# Patient Record
Sex: MALE | Race: WHITE | NOT HISPANIC OR LATINO | Employment: UNEMPLOYED | URBAN - NONMETROPOLITAN AREA
[De-identification: names, ages, dates, MRNs, and addresses within clinical notes are randomized per-mention and may not be internally consistent; named-entity substitution may affect disease eponyms.]

---

## 2018-12-01 ENCOUNTER — HOSPITAL ENCOUNTER (EMERGENCY)
Facility: HOSPITAL | Age: 33
End: 2018-12-01

## 2021-08-26 PROCEDURE — 87635 SARS-COV-2 COVID-19 AMP PRB: CPT | Performed by: NURSE PRACTITIONER

## 2021-09-04 ENCOUNTER — HOSPITAL ENCOUNTER (EMERGENCY)
Facility: HOSPITAL | Age: 36
Discharge: HOME | End: 2021-09-04
Attending: FAMILY MEDICINE | Admitting: FAMILY MEDICINE

## 2021-09-04 ENCOUNTER — APPOINTMENT (OUTPATIENT)
Dept: GENERAL RADIOLOGY | Facility: HOSPITAL | Age: 36
End: 2021-09-04

## 2021-09-04 VITALS
WEIGHT: 181 LBS | TEMPERATURE: 97.8 F | SYSTOLIC BLOOD PRESSURE: 145 MMHG | OXYGEN SATURATION: 98 % | BODY MASS INDEX: 25.91 KG/M2 | DIASTOLIC BLOOD PRESSURE: 81 MMHG | HEART RATE: 96 BPM | HEIGHT: 70 IN | RESPIRATION RATE: 20 BRPM

## 2021-09-04 DIAGNOSIS — S68.119A AMPUTATION OF TIP OF FINGER, INITIAL ENCOUNTER: Primary | ICD-10-CM

## 2021-09-04 PROBLEM — S61.309A TRAUMATIC AVULSION OF NAIL PLATE OF FINGER: Status: ACTIVE | Noted: 2021-09-04

## 2021-09-04 PROBLEM — S68.623A: Status: ACTIVE | Noted: 2021-09-04

## 2021-09-04 PROCEDURE — 96375 TX/PRO/DX INJ NEW DRUG ADDON: CPT

## 2021-09-04 PROCEDURE — 25010000002 HYDROMORPHONE 1 MG/ML SOLUTION: Performed by: FAMILY MEDICINE

## 2021-09-04 PROCEDURE — 99203 OFFICE O/P NEW LOW 30 MIN: CPT | Performed by: ORTHOPAEDIC SURGERY

## 2021-09-04 PROCEDURE — 73140 X-RAY EXAM OF FINGER(S): CPT

## 2021-09-04 PROCEDURE — 99282 EMERGENCY DEPT VISIT SF MDM: CPT

## 2021-09-04 PROCEDURE — 26951 AMPUTATION OF FINGER/THUMB: CPT | Performed by: ORTHOPAEDIC SURGERY

## 2021-09-04 PROCEDURE — 96365 THER/PROPH/DIAG IV INF INIT: CPT

## 2021-09-04 RX ORDER — CEPHALEXIN 500 MG/1
500 CAPSULE ORAL 3 TIMES DAILY
Qty: 21 CAPSULE | Refills: 0 | Status: SHIPPED | OUTPATIENT
Start: 2021-09-04 | End: 2021-10-11

## 2021-09-04 RX ORDER — MELOXICAM 15 MG/1
15 TABLET ORAL DAILY
Qty: 30 TABLET | Refills: 0 | Status: SHIPPED | OUTPATIENT
Start: 2021-09-04 | End: 2021-10-11

## 2021-09-04 RX ORDER — LIDOCAINE HYDROCHLORIDE 20 MG/ML
10 INJECTION, SOLUTION INFILTRATION; PERINEURAL ONCE
Status: DISCONTINUED | OUTPATIENT
Start: 2021-09-04 | End: 2021-09-04

## 2021-09-04 RX ORDER — CEPHALEXIN 500 MG/1
500 CAPSULE ORAL 3 TIMES DAILY
Qty: 21 CAPSULE | Refills: 0 | Status: SHIPPED | OUTPATIENT
Start: 2021-09-04 | End: 2021-09-04 | Stop reason: SDUPTHER

## 2021-09-04 RX ORDER — DIAPER,BRIEF,INFANT-TODD,DISP
EACH MISCELLANEOUS ONCE
Status: COMPLETED | OUTPATIENT
Start: 2021-09-04 | End: 2021-09-04

## 2021-09-04 RX ORDER — LIDOCAINE HYDROCHLORIDE 10 MG/ML
10 INJECTION, SOLUTION EPIDURAL; INFILTRATION; INTRACAUDAL; PERINEURAL ONCE
Status: COMPLETED | OUTPATIENT
Start: 2021-09-04 | End: 2021-09-04

## 2021-09-04 RX ORDER — MELOXICAM 15 MG/1
15 TABLET ORAL DAILY
Qty: 30 TABLET | Refills: 0 | Status: SHIPPED | OUTPATIENT
Start: 2021-09-04 | End: 2021-09-04 | Stop reason: SDUPTHER

## 2021-09-04 RX ADMIN — CEFAZOLIN 1 G: 1 INJECTION, POWDER, FOR SOLUTION INTRAMUSCULAR; INTRAVENOUS; PARENTERAL at 15:48

## 2021-09-04 RX ADMIN — LIDOCAINE HYDROCHLORIDE 10 ML: 10 INJECTION, SOLUTION EPIDURAL; INFILTRATION; INTRACAUDAL; PERINEURAL at 15:51

## 2021-09-04 RX ADMIN — BACITRACIN: 500 OINTMENT TOPICAL at 17:30

## 2021-09-04 RX ADMIN — HYDROMORPHONE HYDROCHLORIDE 1 MG: 1 INJECTION, SOLUTION INTRAMUSCULAR; INTRAVENOUS; SUBCUTANEOUS at 15:48

## 2021-09-04 NOTE — ED NOTES
Patient presents to ED with c/o middle finger on left hand being smashed between a trailer hitch. Patient's finger tip was removed during the incident. The patient has a bandana from home controlling the bleeding at this time. Patient reports a pain level of 8/10 and denies taking anything for pain. Incident occurred less than 1 hour PTA.      Georgia Bethea RN  09/04/21 2870

## 2021-09-04 NOTE — PROGRESS NOTES
"Teto Duque is a 35 y.o. male   Primary provider:  Provider, No Known       Chief Complaint   Patient presents with   • Finger Injury     middle finger left hand       HISTORY OF PRESENT ILLNESS:    History of Present Illness     Patient is a 35-year-old white male who was working with a 2 hitch and he states that there was a miscommunication and his finger was in between the ball joint of the hitch.  Patient tried to pull his finger out but had immediate pain to the left middle finger.  He states that he also had a little injury to his index finger but thinks it was just pinched.  No other bony complaints.  No loss of consciousness.  He reports severe pain of the left middle finger.    CONCURRENT MEDICAL HISTORY:    History reviewed. No pertinent past medical history.    No Known Allergies    No current facility-administered medications for this encounter.    Current Outpatient Medications:   •  cephalexin (KEFLEX) 500 MG capsule, Take 1 capsule by mouth 3 (Three) Times a Day., Disp: 21 capsule, Rfl: 0  •  meloxicam (MOBIC) 15 MG tablet, Take 1 tablet by mouth Daily., Disp: 30 tablet, Rfl: 0    History reviewed. No pertinent surgical history.    History reviewed. No pertinent family history.     Social History     Socioeconomic History   • Marital status:      Spouse name: Not on file   • Number of children: Not on file   • Years of education: Not on file   • Highest education level: Not on file        Review of Systems   Constitutional: Negative for chills and fever.   Respiratory: Negative.    Cardiovascular: Negative.    Gastrointestinal: Negative.    Musculoskeletal:        Left hand injury       PHYSICAL EXAMINATION:       /81 (BP Location: Right arm, Patient Position: Sitting)   Pulse 96   Temp 97.8 °F (36.6 °C) (Temporal)   Resp 20   Ht 177.8 cm (70\")   Wt 82.1 kg (181 lb)   SpO2 98%   BMI 25.97 kg/m²     Physical Exam  Constitutional:       General: He is not in acute distress.     " Appearance: Normal appearance.   Pulmonary:      Effort: Pulmonary effort is normal. No respiratory distress.   Neurological:      Mental Status: He is alert and oriented to person, place, and time.         GAIT:     [x]  Normal  []  Antalgic    Assistive device: [x]  None  []  Walker     []  Crutches  []  Cane     []  Wheelchair  []  Stretcher    Left Hand Exam     Comments:  He has an avulsion injury of the left middle finger involving the distal phalanx.  Two thirds of the nail plate has been removed and the nail is also gone.  A good portion of the distal pulp has been removed as well leaving just the distal phalanx open at the end of the finger.  No specific fracture is identified but the distal third of the phalanx is completely exposed.  Mild amount of redness noted on the pulp of the index finger but no open wound noted on the rest of his hand.  He has pain with any motion or evaluation of the left middle finger.              XR Finger 2+ View Left    Result Date: 9/4/2021  Narrative: Three view left middle finger HISTORY: Soft tissue injury. Pain middle finger. AP, lateral and oblique views obtained. COMPARISON: None FINDINGS: Soft tissue amputation distal middle finger with exposure of the tuft. No fracture or dislocation. No other osseous or articular abnormality.     Impression: CONCLUSION: Soft tissue amputation distal middle finger with exposure of the tuft. No fracture or dislocation. [id number] Electronically signed by:  Иван Abernathy MD  9/4/2021 3:03 PM CDT Workstation: [id number]          ASSESSMENT:    Medical Problems     Hospital Problem List     Partial traumatic transphalangeal amputation of left middle finger, initial encounter    Traumatic avulsion of nail plate of finger                  PLAN    Discussion was had with the patient in that he has lost the soft tissue covering of the distal third of his distal phalanx.  We discussed that we would need to shorten the bone and remove the remainder of  the nail plate.  This was not something that could be treated with just dressing changes and granulation.  We discussed that likely he would have some portion of the distal phalanx remaining but a good portion of it would need to be removed in order to gain soft tissue closure.    The patient voiced understanding of the risks, benefits, and alternative forms of treatment that were discussed and the patient consents to proceed with surgery.  All risks, benefits and alternatives were discussed.  Risks include, but not exclusive to anesthetic complications, including death, MI, CVA, infection, bleeding DVT, fracture, residual pain and need for future surgery.    This discussion was held with the patient by Dev Grullon MD and all questions were answered.    Plan irrigation and debridement of the fingertip amputation with removal of the nail plate and revision amputation.      Procedure:    The base of the left middle finger was then cleansed with alcohol swabs and a digital block was performed on the left middle finger ulnar and radial sides.  Approximately 5 cc of 2% lidocaine without epinephrine was used on both sides.  Once the block had set in then the left hand was prepped with Hibiclens scrub.  A finger tourniquet was applied using a Penrose drain and a hemostat.  The wound was then irrigated and debrided of any foreign material.  The nail bed was assessed at approximately third of the nail plate remained but there was no soft tissue covering surrounding it.  An incision was then made extending proximally in the corner of the eponychial fold on both sides.  The dorsal skin was then elevated and the base of the nail plate and the nail matrix was then removed.  We then assessed the available soft tissue and began shortening the distal phalanx until adequate soft tissue coverage could be achieved.  There was some remaining distal phalanx.  The wound was then copiously irrigated with saline.  We then began closing  the wound using 4-0 nylon suture.  The available skin flaps were utilized to achieve closure over the remaining end of the distal phalanx.  Once adequate closure had been achieved then the wound was covered with bacitracin and a large bulky soft dressing was applied.    The patient was then discharged.  He was going to call to make an appointment for 2 weeks.  We discussed using 1 week of antibiotics and to utilize Epson salt soaks once or twice a day.  He could remove the dressing tomorrow and begin washing his hand with soap and water and he could shower but he was encouraged to keep his hand out of any dirty water (bath water, dishwater, outdoor water sources).  He was also instructed to call the office if there are any changes in the wound appearance or if there were any resultant problems.  He was going to follow-up with either myself, Michelle Clifton, or Coretta Grullon MD

## 2021-09-04 NOTE — ED PROVIDER NOTES
Subjective   Patient injured his left third distal phalanx today, just prior to arrival, after getting his finger caught in a trailer hitch.  Patient states his tetanus status is up-to-date.  He has no other injury.      Finger Injury  Location:  Left middle distal phalynx  Quality:  Aching  Severity:  Moderate  Onset quality:  Sudden  Duration:  1 hour  Timing:  Constant  Progression:  Unchanged  Chronicity:  New  Relieved by:  Nothing  Worsened by:  Nothing  Associated symptoms: no abdominal pain, no chest pain, no congestion, no cough, no diarrhea, no ear pain, no fatigue, no fever, no headaches, no myalgias, no nausea, no rash, no rhinorrhea, no shortness of breath, no sore throat, no vomiting and no wheezing        Review of Systems   Constitutional: Negative for appetite change, chills, diaphoresis, fatigue and fever.   HENT: Negative for congestion, ear discharge, ear pain, nosebleeds, rhinorrhea, sinus pressure, sore throat and trouble swallowing.    Eyes: Negative for discharge and redness.   Respiratory: Negative for apnea, cough, chest tightness, shortness of breath and wheezing.    Cardiovascular: Negative for chest pain.   Gastrointestinal: Negative for abdominal pain, diarrhea, nausea and vomiting.   Endocrine: Negative for polyuria.   Genitourinary: Negative for dysuria, frequency and urgency.   Musculoskeletal: Negative for myalgias and neck pain.   Skin: Positive for wound. Negative for color change and rash.   Allergic/Immunologic: Negative for immunocompromised state.   Neurological: Negative for dizziness, seizures, syncope, weakness, light-headedness and headaches.   Hematological: Negative for adenopathy. Does not bruise/bleed easily.   Psychiatric/Behavioral: Negative for behavioral problems and confusion.   All other systems reviewed and are negative.      No past medical history on file.    No Known Allergies    No past surgical history on file.    No family history on file.    Social  History     Socioeconomic History   • Marital status:      Spouse name: Not on file   • Number of children: Not on file   • Years of education: Not on file   • Highest education level: Not on file           Objective   Physical Exam  Vitals and nursing note reviewed.   Constitutional:       Appearance: He is well-developed.   HENT:      Head: Normocephalic and atraumatic.      Nose: Nose normal.   Eyes:      General: No scleral icterus.        Right eye: No discharge.         Left eye: No discharge.      Conjunctiva/sclera: Conjunctivae normal.      Pupils: Pupils are equal, round, and reactive to light.   Neck:      Trachea: No tracheal deviation.   Cardiovascular:      Rate and Rhythm: Normal rate and regular rhythm.      Heart sounds: Normal heart sounds. No murmur heard.     Pulmonary:      Effort: Pulmonary effort is normal. No respiratory distress.      Breath sounds: Normal breath sounds. No stridor. No wheezing or rales.   Abdominal:      General: Bowel sounds are normal. There is no distension.      Palpations: Abdomen is soft. There is no mass.      Tenderness: There is no abdominal tenderness. There is no guarding or rebound.   Musculoskeletal:        Hands:       Cervical back: Normal range of motion and neck supple.      Comments: Left distal phalanx tip amputation with exposed bone.  Two thirds of the nail bed has been removed.   Skin:     General: Skin is warm and dry.      Findings: No erythema or rash.   Neurological:      Mental Status: He is alert and oriented to person, place, and time.      Coordination: Coordination normal.   Psychiatric:         Behavior: Behavior normal.         Thought Content: Thought content normal.         Procedures           ED Course  ED Course as of Sep 04 1730   Sat Sep 04, 2021   1550 Findings discussed with Dr. Grullon, who will come to the emergency department for wound fixation.    [CB]      ED Course User Index  [CB] Adilson Alvarez MD                                            MDM    Final diagnoses:   Amputation of tip of finger, initial encounter       ED Disposition  ED Disposition     ED Disposition Condition Comment    Discharge Stable           Coretta Carr, APRN  [address] [place] [address] [place], [place] [id number] [phone]    In 2 weeks           Medication List      New Prescriptions    cephalexin 500 MG capsule  Commonly known as: KEFLEX  Take 1 capsule by mouth 3 (Three) Times a Day.     meloxicam 15 MG tablet  Commonly known as: MOBIC  Take 1 tablet by mouth Daily.           Where to Get Your Medications      These medications were sent to [facility] [place] [id number] - [phone]  - [phone] [address] [address]    Phone: [phone]   · cephalexin 500 MG capsule  · meloxicam 15 MG tablet          Adilson Alvarez MD  09/04/21 7875

## 2021-09-04 NOTE — ED NOTES
Wound cleansed with sterile saline and hibiclens.  Dressing applied. Bleeding controlled.      Lucía Andrade RN  09/04/21 3757

## 2021-09-20 ENCOUNTER — OFFICE VISIT (OUTPATIENT)
Dept: ORTHOPEDIC SURGERY | Facility: CLINIC | Age: 36
End: 2021-09-20

## 2021-09-20 VITALS — BODY MASS INDEX: 26.2 KG/M2 | WEIGHT: 183 LBS | HEIGHT: 70 IN

## 2021-09-20 DIAGNOSIS — S68.623A PARTIAL TRAUMATIC TRANSPHALANGEAL AMPUTATION OF LEFT MIDDLE FINGER, INITIAL ENCOUNTER: Primary | ICD-10-CM

## 2021-09-20 DIAGNOSIS — M79.645 PAIN IN FINGER OF LEFT HAND: ICD-10-CM

## 2021-09-20 PROCEDURE — 99024 POSTOP FOLLOW-UP VISIT: CPT | Performed by: NURSE PRACTITIONER

## 2021-09-20 RX ORDER — MULTIPLE VITAMINS W/ MINERALS TAB 9MG-400MCG
1 TAB ORAL DAILY
COMMUNITY

## 2021-09-20 RX ORDER — ACETAMINOPHEN 500 MG
500 TABLET ORAL EVERY 6 HOURS PRN
COMMUNITY
End: 2021-10-11

## 2021-09-20 NOTE — PROGRESS NOTES
"Teto Duque is a 35 y.o. male   Primary provider:  Provider, No Known       Chief Complaint   Patient presents with   • Left Hand - Finger Injury     MIDDLE FINGER.      HISTORY OF PRESENT ILLNESS:    35-year-old male patient presents to office for post procedure follow-up post irrigation and debridement and revision of traumatic fingertip amputation of the left middle finger performed per Dr. Grullon in the ED on 9/4/2021.  Initial injury occurred on that date when his left middle finger was caught between a trailer in a trailer hitch/ball.  Patient was prescribed Keflex and Meloxicam.  Patient has completed the course of Keflex as prescribed.  Patient states he still has Meloxicam and takes it as needed.  Patient is doing well post procedure with no unusual complaints or concerns noted.  Patient reports that overall his pain is well controlled.  Patient does complain of some hypersensitivity, which is new in the past few days and states it feels like \"nerve pain\".  Patient states he does have increased pain with certain motions of his left hand/middle finger and with any bumping against the fingertip.  Pain is currently described as intermittent and mild in severity.  Pain is described as burning in nature with associated swelling.  Pain is worse with palpation against the fingertip.  Pain improves with position changes and anti-inflammatory medications.  Sutures are in place to the left middle finger tip will be removed in office today.    Finger Injury  This is a new (finger caught in a trailer hitch) problem. The current episode started 1 to 4 weeks ago (9/4/2021). The problem occurs intermittently. The problem has been gradually improving. Associated symptoms comments: Burning pain; swelling, hypersensitivity. Exacerbated by: palpation/pressure against the fingertip. He has tried NSAIDs and rest (wound care, Keflex) for the symptoms. The treatment provided mild relief.     CONCURRENT MEDICAL HISTORY:    History " "reviewed. No pertinent past medical history.    No Known Allergies      Current Outpatient Medications:   •  acetaminophen (TYLENOL) 500 MG tablet, Take 500 mg by mouth Every 6 (Six) Hours As Needed for Mild Pain ., Disp: , Rfl:   •  Ascorbic Acid (VITAMIN C PO), Take  by mouth., Disp: , Rfl:   •  Multiple Vitamins-Minerals (ZINC PO), Take  by mouth., Disp: , Rfl:   •  multivitamin with minerals (MULTIVITAMIN ADULT PO), Take 1 tablet by mouth Daily., Disp: , Rfl:   •  cephalexin (KEFLEX) 500 MG capsule, Take 1 capsule by mouth 3 (Three) Times a Day., Disp: 21 capsule, Rfl: 0  •  meloxicam (MOBIC) 15 MG tablet, Take 1 tablet by mouth Daily., Disp: 30 tablet, Rfl: 0    History reviewed. No pertinent surgical history.    History reviewed. No pertinent family history.     Social History     Socioeconomic History   • Marital status:      Spouse name: Not on file   • Number of children: Not on file   • Years of education: Not on file   • Highest education level: Not on file        Review of Systems   Constitutional: Positive for activity change.   HENT: Negative.    Eyes: Negative.    Respiratory: Negative.    Cardiovascular: Negative.    Gastrointestinal: Negative.    Endocrine: Negative.    Genitourinary: Negative.    Musculoskeletal: Negative.         Left middle finger pain/swelling/injury.    Skin: Negative.    Allergic/Immunologic: Negative.    Neurological: Negative.    Hematological: Negative.    Psychiatric/Behavioral: Negative.        PHYSICAL EXAMINATION:       Ht 177.8 cm (70\")   Wt 83 kg (183 lb)   BMI 26.26 kg/m²     Physical Exam  Vitals reviewed.   Constitutional:       General: He is not in acute distress.     Appearance: He is well-developed. He is not ill-appearing.   HENT:      Head: Normocephalic.   Pulmonary:      Effort: Pulmonary effort is normal. No respiratory distress.   Abdominal:      General: There is no distension.      Palpations: Abdomen is soft.   Musculoskeletal:         " General: Swelling (Mild, left middle finger), tenderness (Left middle finger) and signs of injury (Left middle finger) present.   Skin:     General: Skin is warm and dry.      Capillary Refill: Capillary refill takes less than 2 seconds.      Findings: No erythema.   Neurological:      Mental Status: He is alert and oriented to person, place, and time.      GCS: GCS eye subscore is 4. GCS verbal subscore is 5. GCS motor subscore is 6.   Psychiatric:         Speech: Speech normal.         Behavior: Behavior normal.         Thought Content: Thought content normal.         Judgment: Judgment normal.         GAIT:     [x]  Normal  []  Antalgic    Assistive device: [x]  None  []  Walker     []  Crutches  []  Cane     []  Wheelchair  []  Stretcher    Left Hand Exam     Tenderness   Left hand tenderness location: middle finger.     Range of Motion   The patient has normal left wrist ROM.    Muscle Strength   Left wrist normal muscle strength: deferred.    Other   Erythema: absent  Sensation: normal  Pulse: present    Comments:  Wound/incision at the distal fingertip amputation site of the middle finger is well approximated and healing as expected with no signs of infection noted.  No erythema.  No drainage.  Mild, generalized swelling noted at the distal finger.  Hypersensitivity present.            XR Finger 2+ View Left    Result Date: 9/4/2021  Narrative: Three view left middle finger HISTORY: Soft tissue injury. Pain middle finger. AP, lateral and oblique views obtained. COMPARISON: None FINDINGS: Soft tissue amputation distal middle finger with exposure of the tuft. No fracture or dislocation. No other osseous or articular abnormality.     Impression: CONCLUSION: Soft tissue amputation distal middle finger with exposure of the tuft. No fracture or dislocation. [id number] Electronically signed by:  Иван Abernathy MD  9/4/2021 3:03 PM CDT Workstation: [id number]    ASSESSMENT:    Diagnoses and all orders for this  "visit:    Partial traumatic transphalangeal amputation of left middle finger, initial encounter    Pain in finger of left hand    PLAN    Patient is doing well post procedure.  Wound is healing as expected and no signs of infection noted.  Sutures are removed today and Steri-Strips placed.  Wound care instructions reviewed again.  Patient is instructed to keep the wound clean, dry and protected.  Patient is instructed to continue with Epson salt soaks with warm water daily.  Patient reports his pain is mild and overall well-controlled.  He does report some hypersensitivity that he describes as \"nerve pain\".  We discussed that once the wound has fully healed, he can practice some desensitization efforts at the tip of his partially amputated finger.  Patient has some mild swelling but overall swelling is well controlled.  Patient states he has completed the course of Keflex as prescribed.  Patient wants to return to work with some restrictions.  A return to work note is provided today with restrictions on exertional use with the left hand for gripping, lifting, etc. and also for avoiding submerging his left hand in water at this time.  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  Recommend to continue with meloxicam as needed to minimize pain/swelling/inflammation.  Patient states he still has meloxicam at home to take as needed.  Patient can also take Tylenol as needed for additional pain control.  Patient does complain of sensitivity and increased pain when anything bumps against the tip of his left middle finger so he is provided today with a short wiley finger splint for protection during activity and the splint that is provided does not extend to the next joint so it should not cause issues with over immobilization or stiffness.  Patient is instructed to continue to monitor for any signs of infection " including redness, increased swelling, increased tenderness, warmth and/or purulent drainage.  Patient is instructed to notify the office immediately if any such signs of infection are noted.  Follow-up in 2 weeks for recheck of the wound.  Follow-up sooner as needed.    EMR Dragon/Transciption Disclaimer: Some of this note may be an electronic transcription/translation of spoken language to printed text.  The electronic translation of spoken language may permit erroneous, or at times, nonsensical words or phrases to be inadvertently transcribed. Although I have reviewed the note for such errors, some may still exist.     Return in about 2 weeks (around 10/4/2021) for Recheck.        This document has been electronically signed by FUENTES Muller on September 20, 2021 17:15 CDT      FUENTES Muller

## 2021-10-11 ENCOUNTER — OFFICE VISIT (OUTPATIENT)
Dept: ORTHOPEDIC SURGERY | Facility: CLINIC | Age: 36
End: 2021-10-11

## 2021-10-11 VITALS — HEIGHT: 70 IN | BODY MASS INDEX: 26.56 KG/M2 | WEIGHT: 185.5 LBS

## 2021-10-11 DIAGNOSIS — R20.2 NUMBNESS AND TINGLING: ICD-10-CM

## 2021-10-11 DIAGNOSIS — R20.0 NUMBNESS AND TINGLING: ICD-10-CM

## 2021-10-11 DIAGNOSIS — S68.623A PARTIAL TRAUMATIC TRANSPHALANGEAL AMPUTATION OF LEFT MIDDLE FINGER, INITIAL ENCOUNTER: Primary | ICD-10-CM

## 2021-10-11 DIAGNOSIS — M79.645 PAIN IN FINGER OF LEFT HAND: ICD-10-CM

## 2021-10-11 PROCEDURE — 99024 POSTOP FOLLOW-UP VISIT: CPT | Performed by: NURSE PRACTITIONER

## 2021-10-11 RX ORDER — IBUPROFEN 200 MG
200 TABLET ORAL EVERY 6 HOURS PRN
COMMUNITY

## 2021-10-11 NOTE — PROGRESS NOTES
"Teto Duque is a 35 y.o. male returns for 4-week follow-up status post partial traumatic transphalangeal amputation of left middle finger.     Chief Complaint   Patient presents with   • Left Hand - Follow-up       HISTORY OF PRESENT ILLNESS: This 35-year-old male patient presents to office for post procedure follow-up post irrigation and debridement and revision of traumatic fingertip amputation of the left middle finger performed per Dr. Grullon in the ED on 9/4/2021.  Injury occurred by getting his finger caught between a trailer in a trailer hitch/ball.  Patient is doing well post procedure with no unusual complaints or concerns noted except hypersensitivity, which was present last visit.  Patient describes it as \"nerve pain\".  Aggravating factors include bumping his finger or certain movements.  Patient reports symptoms are improving, pain is described as intermittent and very mild in severity.      CONCURRENT MEDICAL HISTORY:    The following portions of the patient's history were reviewed and updated as appropriate: allergies, current medications, past family history, past medical history, past social history, past surgical history and problem list.     ROS  No fevers or chills.  No chest pain or shortness of air.  No GI or  disturbances.    PHYSICAL EXAMINATION:       Ht 177.8 cm (70\")   Wt 84.1 kg (185 lb 8 oz)   BMI 26.62 kg/m²     Physical Exam    GAIT:     [x]  Normal  []  Antalgic    Assistive device: [x]  None  []  Walker     []  Crutches  []  Cane     []  Wheelchair  []  Stretcher    Left Hand Exam     Tenderness   Left hand tenderness location: middle finger.     Range of Motion   The patient has normal left wrist ROM.    Muscle Strength   Left wrist normal muscle strength: deferred.    Other   Erythema: absent  Sensation: normal  Pulse: present    Comments:  The wound/surgical incision to the middle distal fingertip is healing as expected.  The skin is well approximated and intact.  No erythema, " drainage, warmth or odor noted.  Mild, generalized swelling noted to the distal finger.  Hypersensitivity present. Slight decrease in ROM.            No results found.       Three view left middle finger     HISTORY: Soft tissue injury. Pain middle finger.     AP, lateral and oblique views obtained.     COMPARISON: None     FINDINGS:   Soft tissue amputation distal middle finger with exposure of the  tuft.  No fracture or dislocation.  No other osseous or articular abnormality.     IMPRESSION:  CONCLUSION:  Soft tissue amputation distal middle finger with exposure of the  tuft.  No fracture or dislocation.     [id number]     Electronically signed by:  Иван Abernathy MD  9/4/2021 3:03 PM CDT  Workstation: [id number]      ASSESSMENT:    Diagnoses and all orders for this visit:    Partial traumatic transphalangeal amputation of left middle finger, initial encounter    Pain in finger of left hand    Other orders  -     ibuprofen (ADVIL,MOTRIN) 200 MG tablet; Take 200 mg by mouth Every 6 (Six) Hours As Needed for Mild Pain .      PLAN:   Instructed patient to continue use of short wiley splint as needed to prevent injury or bumping increasing pain to site.  Encouraged patient to continue Epson salt soaks 2-3 times a day 4 to 5 minutes each episode.  Instructed patient to avoid activity that may increase pain or injury to healing surgical site.  Educated on signs and symptoms of infection including but not limited to erythema, warmth, odor, and drainage.  Instructed patient to call the office for follow-up appointment center if he should see the signs or symptoms.  Instructed to continue over-the-counter ibuprofen per label as needed for pain.  Discussed home exercise program and physical therapy, patient wishes to go to physical therapy for evaluation and treatment.  Follow-up in 4 weeks or sooner as needed, patient instructed to call the office for new appointment if symptoms change or worsen.    Return in about 4 weeks  (around 11/8/2021) for evaluate tenderness and PT results.    FUENTES Betancourt      This document has been electronically signed by FUENTES Betancourt on October 11, 2021 12:43 CDT

## 2021-11-08 ENCOUNTER — OFFICE VISIT (OUTPATIENT)
Dept: ORTHOPEDIC SURGERY | Facility: CLINIC | Age: 36
End: 2021-11-08

## 2021-11-08 VITALS — HEIGHT: 70 IN | WEIGHT: 183 LBS | BODY MASS INDEX: 26.2 KG/M2

## 2021-11-08 DIAGNOSIS — M79.645 PAIN IN FINGER OF LEFT HAND: ICD-10-CM

## 2021-11-08 DIAGNOSIS — S68.623A PARTIAL TRAUMATIC TRANSPHALANGEAL AMPUTATION OF LEFT MIDDLE FINGER, INITIAL ENCOUNTER: Primary | ICD-10-CM

## 2021-11-08 PROCEDURE — 99024 POSTOP FOLLOW-UP VISIT: CPT | Performed by: NURSE PRACTITIONER

## 2021-11-08 NOTE — PROGRESS NOTES
"Teto Duque is a 35 y.o. male returns for 8-week follow-up status post partial traumatic transphalangeal amputation of left middle finger.     Chief Complaint   Patient presents with   • Left Hand - Follow-up       HISTORY OF PRESENT ILLNESS: This 35-year-old male patient presents to the office today for 8-week follow-up status post post irrigation and debridement of revision of traumatic fingertip amputation of the left middle finger performed by Dr. Grullon on September 4, 2021.  Patient is healing well.  Patient has no complaints of pain this visit.  Patient reports \"tight feeling \"and voices concerns about crepitus at the distal aspect of the patient's left middle finger.  Patient reports sensation is improved.  Patient denies numbness or tingling.  Patient denies fever or chills.       CONCURRENT MEDICAL HISTORY:    The following portions of the patient's history were reviewed and updated as appropriate: allergies, current medications, past family history, past medical history, past social history, past surgical history and problem list.     ROS  No fevers or chills.  No chest pain or shortness of air.  No GI or  disturbances.    PHYSICAL EXAMINATION:       Ht 177.8 cm (70\")   Wt 83 kg (183 lb)   BMI 26.26 kg/m²     Physical Exam    GAIT:     [x]  Normal  []  Antalgic    Assistive device: [x]  None  []  Walker     []  Crutches  []  Cane     []  Wheelchair  []  Stretcher    Left Hand Exam     Tenderness   The patient is experiencing no tenderness.     Range of Motion   The patient has normal left wrist ROM.    Muscle Strength   Wrist extension: 5/5   Wrist flexion: 5/5   :  5/5     Other   Erythema: absent  Sensation: normal  Pulse: present    Comments:  The wound/surgical incision to the middle distal fingertip has healed. Incision well approximated and intact.  No erythema, drainage, warmth or odor noted.  Swelling improved, next to no swelling noted. Hypersensitivity present but improving. Slight " decrease in ROM remains present.              No results found.  Study Result    Narrative & Impression      Three view left middle finger     HISTORY: Soft tissue injury. Pain middle finger.     AP, lateral and oblique views obtained.     COMPARISON: None     FINDINGS:   Soft tissue amputation distal middle finger with exposure of the  tuft.  No fracture or dislocation.  No other osseous or articular abnormality.     IMPRESSION:  CONCLUSION:  Soft tissue amputation distal middle finger with exposure of the  tuft.  No fracture or dislocation.     [id number]     Electronically signed by:  Иван Abernathy MD  9/4/2021 3:03 PM CDT  Workstation: [id number]             ASSESSMENT:    Diagnoses and all orders for this visit:    Partial traumatic transphalangeal amputation of left middle finger, initial encounter    Pain in finger of left hand    PLAN:  1.  Patient does not have a short wiley splint on at visit today.  Patient has not been using it.  Okay to discontinue.  2.  Encourage patient to continue Epson salt soaks as needed if he feels the distal portion of the wound is not healing well.  3.  Instructed patient to call the office if he notices changes to wound. Patient educated on signs and symptoms of infection to look for such as erythema, warmth, odor and/or drainage.  4.  Provided patient with exercises to follow and encourage patient to use home exercises to improve range of motion and strength to the left middle digit.  Patient does not wish to go to physical therapy at this time.  5.  Follow-up in 6 weeks or sooner as needed if symptoms should change or worsen.    All questions and concerns are addressed with understanding noted. They are aware and are in agreement to this plan.    Return in about 6 weeks (around 12/20/2021) for Recheck.    FUENTES Betancourt     This document has been electronically signed by FUENTES Betancourt on November 10, 2021 12:05 CST    EMR Dragon/Transciption Disclaimer: Some  of this note may be an electronic transcription/translation of spoken language to printed text.  The electronic translation of spoken language may permit erroneous, or at times, nonsensical words or phrases to be inadvertently transcribed. Although I have reviewed the note for such errors, some may still exist.

## 2021-12-27 ENCOUNTER — OFFICE VISIT (OUTPATIENT)
Dept: ORTHOPEDIC SURGERY | Facility: CLINIC | Age: 36
End: 2021-12-27

## 2021-12-27 VITALS — WEIGHT: 183 LBS | BODY MASS INDEX: 26.2 KG/M2 | HEIGHT: 70 IN

## 2021-12-27 DIAGNOSIS — S61.309D TRAUMATIC AVULSION OF NAIL PLATE OF FINGER, SUBSEQUENT ENCOUNTER: ICD-10-CM

## 2021-12-27 DIAGNOSIS — S68.623D PARTIAL TRAUMATIC TRANSPHALANGEAL AMPUTATION OF LEFT MIDDLE FINGER, SUBSEQUENT ENCOUNTER: Primary | ICD-10-CM

## 2021-12-27 PROCEDURE — 99212 OFFICE O/P EST SF 10 MIN: CPT | Performed by: NURSE PRACTITIONER

## 2021-12-27 NOTE — PROGRESS NOTES
"Teto Duque is a 36 y.o. male returns for     Chief Complaint   Patient presents with   • Left Hand - Follow-up       HISTORY OF PRESENT ILLNESS:S/P repair of partial transphalangeal amputation of left middle finger.  This injury occurred September 4, 2021.  The patient is healing well.  And has no unusual complaints today.  Patient reports increased sensitivity to the distal aspect of the finger and questions the indention in the very distal aspect.  The patient states \"I feel like it's not closing all the way\".  Patient denies redness, drainage or odor.  Patient denies fever or chills.  Patient denies numbness or tingling.   09/04/21 Adilson Alvarez MD    Partial traumatic transphalangeal amputation of left middle finger, initial encounter ...       Pain scale today0/10       CONCURRENT MEDICAL HISTORY:    The following portions of the patient's history were reviewed and updated as appropriate: allergies, current medications, past family history, past medical history, past social history, past surgical history and problem list.     ROS  No fevers or chills.  No chest pain or shortness of air.  No GI or  disturbances.    PHYSICAL EXAMINATION:       Ht 177.8 cm (70\")   Wt 83 kg (183 lb)   BMI 26.26 kg/m²     Physical Exam    GAIT:     [x]  Normal  []  Antalgic    Assistive device: [x]  None  []  Walker     []  Crutches  []  Cane     []  Wheelchair  []  Stretcher    Left Hand Exam     Tenderness   The patient is experiencing no tenderness.     Range of Motion   The patient has normal left wrist ROM.    Muscle Strength   Wrist extension: 5/5   Wrist flexion: 5/5   :  5/5     Other   Erythema: absent  Sensation: normal  Pulse: present    Comments:  The surgical incision/wound has healed completely.  No erythema, drainage, warmth or odor noted.  No swelling.  Hypersensitivity present.  Range of motion improved.  Cap refill less than 3.              No results found.          ASSESSMENT:    Diagnoses and all " orders for this visit:    Partial traumatic transphalangeal amputation of left middle finger, subsequent encounter    Traumatic avulsion of nail plate of finger, subsequent encounter          PLAN:  Instructed patient to follow-up as needed if symptoms should return or worsen.  Encourage patient to continue home exercises and strength exercises provided.  Patient educated if there is a change in the wound he should call the office for a return appointment.    All questions and concerns are addressed with understanding noted. They are aware and are in agreement to this plan.    Return if symptoms worsen or fail to improve, for Recheck.    FUENTES Betancourt     This document has been electronically signed by FUENTES Betancourt on December 27, 2021 08:41 CST      EMR Dragon/Transciption Disclaimer: Some of this note may be an electronic transcription/translation of spoken language to printed text.  The electronic translation of spoken language may permit erroneous, or at times, nonsensical words or phrases to be inadvertently transcribed. Although I have reviewed the note for such errors, some may still exist.   .  Time spent of a minimum of 15  minutes including the face to face evaluation, reviewing of medical history and prior medial records.